# Patient Record
Sex: MALE | NOT HISPANIC OR LATINO | URBAN - METROPOLITAN AREA
[De-identification: names, ages, dates, MRNs, and addresses within clinical notes are randomized per-mention and may not be internally consistent; named-entity substitution may affect disease eponyms.]

---

## 2022-10-26 DIAGNOSIS — Z12.11 SCREENING FOR COLON CANCER: Primary | ICD-10-CM

## 2024-01-31 ENCOUNTER — TELEPHONE (OUTPATIENT)
Dept: ENDOSCOPY | Facility: HOSPITAL | Age: 49
End: 2024-01-31
Payer: COMMERCIAL

## 2024-01-31 NOTE — TELEPHONE ENCOUNTER
----- Message from Juliette Younger sent at 1/22/2024  8:16 AM CST -----  Regarding: FW: Referral    ----- Message -----  From: Angelique Gilman  Sent: 1/19/2024   8:30 AM CST  To: [facility] Endo Schedulers  Subject: Referral                                         Good morning,    Dr. Granados would like to refer the following patient to the Gastro department for a cscope. I have scanned the patients referral and records into .     Thanks,    Angelique BE  [facility]

## 2025-01-07 ENCOUNTER — HOSPITAL ENCOUNTER (EMERGENCY)
Facility: HOSPITAL | Age: 50
Discharge: HOME | End: 2025-01-07
Attending: EMERGENCY MEDICINE
Payer: COMMERCIAL

## 2025-01-07 VITALS
TEMPERATURE: 98 F | DIASTOLIC BLOOD PRESSURE: 77 MMHG | OXYGEN SATURATION: 97 % | RESPIRATION RATE: 20 BRPM | HEART RATE: 84 BPM | SYSTOLIC BLOOD PRESSURE: 137 MMHG

## 2025-01-07 DIAGNOSIS — I48.91 ATRIAL FIBRILLATION WITH RAPID VENTRICULAR RESPONSE: Primary | ICD-10-CM

## 2025-01-07 LAB
ALBUMIN SERPL BCP-MCNC: 4.3 G/DL (ref 3.5–5.2)
ALP SERPL-CCNC: 64 U/L (ref 40–150)
ALT SERPL W/O P-5'-P-CCNC: 27 U/L (ref 10–44)
ANION GAP SERPL CALC-SCNC: 12 MMOL/L (ref 8–16)
AST SERPL-CCNC: 20 U/L (ref 10–40)
BASOPHILS # BLD AUTO: 0.07 K/UL (ref 0–0.2)
BASOPHILS NFR BLD: 0.7 % (ref 0–1.9)
BILIRUB SERPL-MCNC: 0.7 MG/DL (ref 0.1–1)
BNP SERPL-MCNC: <10 PG/ML (ref 0–99)
BUN SERPL-MCNC: 27 MG/DL (ref 6–20)
CALCIUM SERPL-MCNC: 10.5 MG/DL (ref 8.7–10.5)
CHLORIDE SERPL-SCNC: 106 MMOL/L (ref 95–110)
CO2 SERPL-SCNC: 19 MMOL/L (ref 23–29)
CREAT SERPL-MCNC: 1.4 MG/DL (ref 0.5–1.4)
DIFFERENTIAL METHOD BLD: ABNORMAL
EOSINOPHIL # BLD AUTO: 0.1 K/UL (ref 0–0.5)
EOSINOPHIL NFR BLD: 1.1 % (ref 0–8)
ERYTHROCYTE [DISTWIDTH] IN BLOOD BY AUTOMATED COUNT: 12.8 % (ref 11.5–14.5)
EST. GFR  (NO RACE VARIABLE): >60 ML/MIN/1.73 M^2
GLUCOSE SERPL-MCNC: 199 MG/DL (ref 70–110)
HCT VFR BLD AUTO: 49.5 % (ref 40–54)
HCV AB SERPL QL IA: NORMAL
HGB BLD-MCNC: 16.2 G/DL (ref 14–18)
HIV 1+2 AB+HIV1 P24 AG SERPL QL IA: NORMAL
IMM GRANULOCYTES # BLD AUTO: 0.14 K/UL (ref 0–0.04)
IMM GRANULOCYTES NFR BLD AUTO: 1.3 % (ref 0–0.5)
LYMPHOCYTES # BLD AUTO: 1.2 K/UL (ref 1–4.8)
LYMPHOCYTES NFR BLD: 11.2 % (ref 18–48)
MCH RBC QN AUTO: 27.5 PG (ref 27–31)
MCHC RBC AUTO-ENTMCNC: 32.7 G/DL (ref 32–36)
MCV RBC AUTO: 84 FL (ref 82–98)
MONOCYTES # BLD AUTO: 0.8 K/UL (ref 0.3–1)
MONOCYTES NFR BLD: 7 % (ref 4–15)
NEUTROPHILS # BLD AUTO: 8.4 K/UL (ref 1.8–7.7)
NEUTROPHILS NFR BLD: 78.7 % (ref 38–73)
NRBC BLD-RTO: 0 /100 WBC
OHS QRS DURATION: 74 MS
OHS QTC CALCULATION: 431 MS
PLATELET # BLD AUTO: 328 K/UL (ref 150–450)
PMV BLD AUTO: 10.3 FL (ref 9.2–12.9)
POTASSIUM SERPL-SCNC: 4.1 MMOL/L (ref 3.5–5.1)
PROT SERPL-MCNC: 8.5 G/DL (ref 6–8.4)
RBC # BLD AUTO: 5.89 M/UL (ref 4.6–6.2)
SODIUM SERPL-SCNC: 137 MMOL/L (ref 136–145)
TROPONIN I SERPL DL<=0.01 NG/ML-MCNC: <3 NG/L (ref 0–35)
WBC # BLD AUTO: 10.68 K/UL (ref 3.9–12.7)

## 2025-01-07 PROCEDURE — 93010 ELECTROCARDIOGRAM REPORT: CPT | Mod: ,,, | Performed by: INTERNAL MEDICINE

## 2025-01-07 PROCEDURE — 85025 COMPLETE CBC W/AUTO DIFF WBC: CPT | Performed by: EMERGENCY MEDICINE

## 2025-01-07 PROCEDURE — 83880 ASSAY OF NATRIURETIC PEPTIDE: CPT | Performed by: EMERGENCY MEDICINE

## 2025-01-07 PROCEDURE — 86803 HEPATITIS C AB TEST: CPT | Performed by: PHYSICIAN ASSISTANT

## 2025-01-07 PROCEDURE — 87389 HIV-1 AG W/HIV-1&-2 AB AG IA: CPT | Performed by: PHYSICIAN ASSISTANT

## 2025-01-07 PROCEDURE — 96374 THER/PROPH/DIAG INJ IV PUSH: CPT

## 2025-01-07 PROCEDURE — 84484 ASSAY OF TROPONIN QUANT: CPT | Performed by: EMERGENCY MEDICINE

## 2025-01-07 PROCEDURE — 93005 ELECTROCARDIOGRAM TRACING: CPT

## 2025-01-07 PROCEDURE — 80053 COMPREHEN METABOLIC PANEL: CPT | Performed by: EMERGENCY MEDICINE

## 2025-01-07 PROCEDURE — 96361 HYDRATE IV INFUSION ADD-ON: CPT

## 2025-01-07 PROCEDURE — 93010 ELECTROCARDIOGRAM REPORT: CPT | Mod: 77,,, | Performed by: INTERNAL MEDICINE

## 2025-01-07 PROCEDURE — 99284 EMERGENCY DEPT VISIT MOD MDM: CPT | Mod: 25

## 2025-01-07 PROCEDURE — 25000003 PHARM REV CODE 250: Performed by: EMERGENCY MEDICINE

## 2025-01-07 RX ORDER — METOPROLOL TARTRATE 25 MG/1
25 TABLET, FILM COATED ORAL 2 TIMES DAILY
Qty: 60 TABLET | Refills: 0 | Status: SHIPPED | OUTPATIENT
Start: 2025-01-07 | End: 2025-01-14 | Stop reason: SDUPTHER

## 2025-01-07 RX ORDER — DILTIAZEM HYDROCHLORIDE 5 MG/ML
20 INJECTION INTRAVENOUS
Status: COMPLETED | OUTPATIENT
Start: 2025-01-07 | End: 2025-01-07

## 2025-01-07 RX ADMIN — DILTIAZEM HYDROCHLORIDE 20 MG: 5 INJECTION INTRAVENOUS at 04:01

## 2025-01-07 RX ADMIN — SODIUM CHLORIDE 500 ML: 9 INJECTION, SOLUTION INTRAVENOUS at 03:01

## 2025-01-07 NOTE — DISCHARGE INSTRUCTIONS
STOP your carvediolol.  START metoprolol and xarelto.    Our goal in the emergency department is to always give you outstanding care and exceptional service. You may receive a survey by mail or e-mail in the next week regarding your experience in our ED. We would greatly appreciate your completing and returning the survey. Your feedback provides us with a way to recognize our staff who give very good care and it helps us learn how to improve when your experience was below our aspiration of excellence.

## 2025-01-07 NOTE — ED TRIAGE NOTES
Jaye Henryleeann Ferguson Jr., a 49 y.o. male presents to the ED w/ complaint of new onset afib. Pt presents to ED via personal vehicle with referral from PCP for new onset afib. Pt states he went to the pcp had labs drawn and an ultrasound and was told to come to ED.     Triage note:  Chief Complaint   Patient presents with    new onset afib     Patient sent by primary for new onset Afib with rvr.     Review of patient's allergies indicates:  Not on File  No past medical history on file.

## 2025-01-07 NOTE — ED PROVIDER NOTES
Encounter Date: 1/7/2025       History     Chief Complaint   Patient presents with    new onset afib     Patient sent by primary for new onset Afib with rvr.     49-year-old male with a history of hypertension, hyperlipidemia, and diabetes presents with elevated heart rate.  Found to be in AFib during routine physical.  He denies chest pain, shortness of breath, fatigue, or palpitations.  He recently had a URI.  Today denies cough, nausea, vomiting, abdominal pain, or dysuria.  He is on carvedilol twice a day.  The patients available PMH, PSH, Social History, medications, allergies, and triage vital signs were reviewed just prior to their medical evaluation.         Review of patient's allergies indicates:  Not on File  History reviewed. No pertinent past medical history.  History reviewed. No pertinent surgical history.  No family history on file.  Social History     Tobacco Use    Smoking status: Never     Passive exposure: Never    Smokeless tobacco: Never   Substance Use Topics    Drug use: Never     Review of Systems   Constitutional:  Negative for fever.   Respiratory:  Negative for cough and shortness of breath.    Cardiovascular:  Negative for chest pain.   Gastrointestinal:  Negative for abdominal pain, nausea and vomiting.   Genitourinary:  Negative for dysuria.       Physical Exam     Initial Vitals [01/07/25 1403]   BP Pulse Resp Temp SpO2   (!) 144/84 (!) 120 20 98 °F (36.7 °C) 100 %      MAP       --         Physical Exam    Nursing note and vitals reviewed.  Constitutional: He appears well-developed and well-nourished. He is not diaphoretic. No distress.   HENT:   Head: Normocephalic and atraumatic.   Nose: Nose normal.   Eyes: Conjunctivae are normal. Right eye exhibits no discharge. Left eye exhibits no discharge.   Neck: Neck supple.   Normal range of motion.  Cardiovascular:  Normal heart sounds.     Exam reveals no gallop and no friction rub.       No murmur heard.  Tachycardia, irregular  irregular   Pulmonary/Chest: Breath sounds normal. No respiratory distress. He has no wheezes. He has no rhonchi. He has no rales.   Abdominal: Abdomen is soft. He exhibits no distension. There is no abdominal tenderness. There is no rebound and no guarding.   Musculoskeletal:         General: No tenderness or edema. Normal range of motion.      Cervical back: Normal range of motion and neck supple.     Neurological: He is alert and oriented to person, place, and time. GCS score is 15. GCS eye subscore is 4. GCS verbal subscore is 5. GCS motor subscore is 6.   Skin: Skin is warm and dry. No rash noted. No erythema.   Psychiatric: He has a normal mood and affect. His behavior is normal. Judgment and thought content normal.         ED Course   Procedures  Labs Reviewed   CBC W/ AUTO DIFFERENTIAL - Abnormal       Result Value    WBC 10.68      RBC 5.89      Hemoglobin 16.2      Hematocrit 49.5      MCV 84      MCH 27.5      MCHC 32.7      RDW 12.8      Platelets 328      MPV 10.3      Immature Granulocytes 1.3 (*)     Gran # (ANC) 8.4 (*)     Immature Grans (Abs) 0.14 (*)     Lymph # 1.2      Mono # 0.8      Eos # 0.1      Baso # 0.07      nRBC 0      Gran % 78.7 (*)     Lymph % 11.2 (*)     Mono % 7.0      Eosinophil % 1.1      Basophil % 0.7      Differential Method Automated      Narrative:     Release to patient->Immediate   COMPREHENSIVE METABOLIC PANEL - Abnormal    Sodium 137      Potassium 4.1      Chloride 106      CO2 19 (*)     Glucose 199 (*)     BUN 27 (*)     Creatinine 1.4      Calcium 10.5      Total Protein 8.5 (*)     Albumin 4.3      Total Bilirubin 0.7      Alkaline Phosphatase 64      AST 20      ALT 27      eGFR >60.0      Anion Gap 12      Narrative:     Release to patient->Immediate   TROPONIN I HIGH SENSITIVITY    Troponin I High Sensitivity <3      Narrative:     Release to patient->Immediate   B-TYPE NATRIURETIC PEPTIDE    BNP <10      Narrative:     Release to patient->Immediate   HEPATITIS  C ANTIBODY    Hepatitis C Ab Non-reactive      Narrative:     Release to patient->Immediate   HIV 1 / 2 ANTIBODY    HIV 1/2 Ag/Ab Non-reactive      Narrative:     Release to patient->Immediate     EKG Readings: (Independently Interpreted)   Initial Reading: No STEMI. Rhythm: Atrial Fibrillation. Heart Rate: 115. Ectopy: No Ectopy. Conduction: Normal. ST Segments: Normal ST Segments. T Waves: Normal.   Afib with rvr, poor qrs progression in V3-4      ECG Results              EKG 12-lead (Final result)        Collection Time Result Time QRS Duration OHS QTC Calculation    01/07/25 15:17:14 01/07/25 15:31:22 74 431                     Final result by Interface, Lab In [facility] (01/07/25 15:31:25)                   Narrative:    Test Reason : R07.9,    Vent. Rate : 115 BPM     Atrial Rate : 187 BPM     P-R Int :    ms          QRS Dur :  74 ms      QT Int : 312 ms       P-R-T Axes :     16  75 degrees    QTcB Int : 431 ms    Atrial fibrillation with rapid ventricular response  Abnormal ECG  No previous ECGs available  Confirmed by Deloris Madrigal ([id number]) on 1/7/2025 3:31:17 PM    Referred By: AAAREFERRAL SELF           Confirmed By: Deloris Madrigal                                  Imaging Results    None          Medications   sodium chloride 0.9% bolus 500 mL 500 mL (0 mLs Intravenous Stopped 1/7/25 1658)   diltiaZEM injection 20 mg (20 mg Intravenous Given 1/7/25 1607)     Medical Decision Making  49-year-old male presents with new AFib with RVR.  Vitals with tachycardia.  Physical exam as above.  EKG confirms AFib with RVR.  Treated with IV fluids and diltiazem.  Now rate controlled.  Rhythm is still atrial fibrillation.  Will discharge home with new prescriptions for metoprolol and Xarelto.  Will discontinue carvedilol.  Urgent consult placed for EP follow-up.  He will call tomorrow to arrange.  Patient will return to ED for worsening symptoms, inability to eat/drink, fever greater than 100.4, or any other concerns. Did  bedside teaching with return precautions.  All questions answered.  The patient acknowledges understanding.  Gave written and verbal discharge instructions.     Amount and/or Complexity of Data Reviewed  Labs: ordered. Decision-making details documented in ED Course.  ECG/medicine tests: ordered and independent interpretation performed. Decision-making details documented in ED Course.    Risk  Prescription drug management.    Critical Care  Total time providing critical care: 35 minutes                   Critical Care:  Date: 01/07/2025  Performed by: Dr. Arturo Ratliff   Authorized by: Dr. Arturo Ratliff  Total critical care time (exclusive of procedural time) : 35 minutes  Critical care was necessary to treat or prevent imminent or life-threatening deterioration of the following conditions:  afib with rvr needing iv diltiazem                    Clinical Impression:  Final diagnoses:  [I48.91] Atrial fibrillation with rapid ventricular response (Primary)          ED Disposition Condition    Discharge Stable          ED Prescriptions       Medication Sig Dispense Start Date End Date Auth. Provider    rivaroxaban (XARELTO) 20 mg Tab Take 1 tablet (20 mg total) by mouth daily with dinner or evening meal. 30 tablet 1/7/2025 2/6/2025 Arturo Ratliff MD    metoprolol tartrate (LOPRESSOR) 25 MG tablet Take 1 tablet (25 mg total) by mouth 2 (two) times daily. 60 tablet 1/7/2025 2/6/2025 Arturo Ratliff MD          Follow-up Information       Follow up With Specialties Details Why Contact Info Additional Information    [facility] - Cardiology - 3rd Fl Cardiology   [address] [facility] [id number] [phone] Cardiology Services Clinics - 3rd floor    [facility] - Emergency Dept Emergency Medicine  Return to ED for worsening symptoms, inability to eat/drink, fever greater than 100.4, or any other concerns. [address] [facility] [id number] [phone]              Evy  Arturo ARMENDARIZ MD  01/07/25 4504

## 2025-01-07 NOTE — FIRST PROVIDER EVALUATION
Medical screening examination initiated.  I have conducted a focused provider triage encounter, findings are as follows:    Brief history of present illness:  Referred by PCP for new onset Afib w/ RVR at PCP    There were no vitals filed for this visit.    Pertinent physical exam:  Well appearing, no distress. Ambulating without difficulty.     Brief workup plan:  Afib w/u    Preliminary workup initiated; this workup will be continued and followed by the physician or advanced practice provider that is assigned to the patient when roomed.

## 2025-01-08 LAB
OHS QRS DURATION: 74 MS
OHS QRS DURATION: 76 MS
OHS QTC CALCULATION: 401 MS
OHS QTC CALCULATION: 422 MS

## 2025-01-14 ENCOUNTER — OFFICE VISIT (OUTPATIENT)
Dept: CARDIOLOGY | Facility: CLINIC | Age: 50
End: 2025-01-14
Payer: COMMERCIAL

## 2025-01-14 VITALS
HEIGHT: 71 IN | OXYGEN SATURATION: 99 % | DIASTOLIC BLOOD PRESSURE: 90 MMHG | BODY MASS INDEX: 27.53 KG/M2 | SYSTOLIC BLOOD PRESSURE: 134 MMHG | WEIGHT: 196.63 LBS | HEART RATE: 87 BPM

## 2025-01-14 DIAGNOSIS — I10 ESSENTIAL HYPERTENSION: ICD-10-CM

## 2025-01-14 DIAGNOSIS — I48.91 ATRIAL FIBRILLATION WITH RAPID VENTRICULAR RESPONSE: Primary | ICD-10-CM

## 2025-01-14 DIAGNOSIS — E78.2 MIXED HYPERLIPIDEMIA: ICD-10-CM

## 2025-01-14 PROCEDURE — 99999 PR PBB SHADOW E&M-EST. PATIENT-LVL IV: CPT | Mod: PBBFAC,,, | Performed by: INTERNAL MEDICINE

## 2025-01-14 PROCEDURE — 99204 OFFICE O/P NEW MOD 45 MIN: CPT | Mod: S$GLB,,, | Performed by: INTERNAL MEDICINE

## 2025-01-14 RX ORDER — LISINOPRIL 20 MG/1
20 TABLET ORAL
COMMUNITY

## 2025-01-14 RX ORDER — METOPROLOL TARTRATE 25 MG/1
25 TABLET, FILM COATED ORAL 2 TIMES DAILY
Qty: 180 TABLET | Refills: 3 | Status: SHIPPED | OUTPATIENT
Start: 2025-01-14 | End: 2026-01-09

## 2025-01-14 RX ORDER — ROSUVASTATIN CALCIUM 20 MG/1
20 TABLET, COATED ORAL DAILY
COMMUNITY

## 2025-01-14 RX ORDER — TIRZEPATIDE 7.5 MG/.5ML
7.5 INJECTION, SOLUTION SUBCUTANEOUS
COMMUNITY

## 2025-01-14 RX ORDER — FENOFIBRATE 160 MG/1
160 TABLET ORAL DAILY
COMMUNITY

## 2025-01-17 PROBLEM — E78.2 MIXED HYPERLIPIDEMIA: Status: ACTIVE | Noted: 2025-01-17

## 2025-01-17 PROBLEM — I10 ESSENTIAL HYPERTENSION: Status: ACTIVE | Noted: 2025-01-17

## 2025-01-17 PROBLEM — I48.91 ATRIAL FIBRILLATION WITH RAPID VENTRICULAR RESPONSE: Status: ACTIVE | Noted: 2025-01-17

## 2025-01-17 NOTE — PROGRESS NOTES
Subjective:   Chief Complaint: Atrial Fibrillation  Last Clinic Visit: New Patient    History of Present Illness: Jaye Ferguson Jr. is a 49 y.o. gentleman with recently diagnosed atrial fibrillation, hypertension, hyperlipidemia, who presents to establish cardiology care.  He was referred to electrophysiology, but the got scheduled into general cardiology clinic not clear how.  He reports recently going to his primary care doctor who noted that he had elevated heart rate, EKG obtained showing atrial fibrillation and he was referred to the ED. presented to the ED was given metoprolol had reduction in heart rate but maintained atrial fibrillation relatively asymptomatic no evidence of heart failure or underlying triggering cause/illness, thus started on anticoagulation and set up with Cardiology as an outpatient.  He denies any known prior history of atrial fibrillation no personal history of stroke or TIA, denies any palpitations.  Exercises on a regular basis running on the elliptical 4 to 5 times a week denies any significant exercise intolerance.  No excessive recent alcohol use.  He has had hypertension since he was in his 20s denies any tobacco use, also has some hyperlipidemia.  Has a family history of hypertension in his mother otherwise no family history of premature atherosclerosis.  Checks his blood pressure intermittently reporting running in the 130s over 80s.  Works full-time as a  for [employer] in the [place] area.  Denies any significant bleeding issues, falls, or working with very sharp objects, does have some minor cuts and abrasions on his hands at work in the train y[place]  Telemetry strip obtained in the office this morning shows resolution of atrial fibrillation with sinus rhythm.  He reports he recently had a full echocardiogram obtained by his primary care doctor, and was told it was normal.    Dx:  Recently diagnosed atrial fibrillation   Hypertension  "  Hyperlipidemia    Medications:  Outpatient Encounter Medications as of 1/14/2025   Medication Sig Dispense Refill    fenofibrate 160 MG Tab Take 160 mg by mouth once daily.      lisinopriL (PRINIVIL,ZESTRIL) 20 MG tablet Take 20 mg by mouth before evening meal.      rosuvastatin (CRESTOR) 20 MG tablet Take 20 mg by mouth once daily.      tirzepatide (MOUNJARO) 7.5 mg/0.5 mL PnIj Inject 7.5 mg into the skin every 7 days.      [DISCONTINUED] metoprolol tartrate (LOPRESSOR) 25 MG tablet Take 1 tablet (25 mg total) by mouth 2 (two) times daily. 60 tablet 0    [DISCONTINUED] rivaroxaban (XARELTO) 20 mg Tab Take 1 tablet (20 mg total) by mouth daily with dinner or evening meal. 30 tablet 0    metoprolol tartrate (LOPRESSOR) 25 MG tablet Take 1 tablet (25 mg total) by mouth 2 (two) times daily. 180 tablet 3    rivaroxaban (XARELTO) 20 mg Tab Take 1 tablet (20 mg total) by mouth daily with dinner or evening meal. 90 tablet 3     No facility-administered encounter medications on file as of 1/14/2025.     Social History:  Asterjones reports that he has quit smoking. His smoking use included cigarettes. He has never been exposed to tobacco smoke. He has never used smokeless tobacco. He reports that he does not use drugs.    Objective:   BP (!) 134/90 (BP Location: Left arm, Patient Position: Sitting)   Pulse 87   Ht 5' 11" (1.803 m)   Wt 89.2 kg (196 lb 10.4 oz)   SpO2 99%   BMI 27.43 kg/m²     Physical Exam   Constitutional: He does not appear ill. No distress.   HENT:   Head: Normocephalic and atraumatic. Mouth/Throat: Mucous membranes are moist.   Cardiovascular: Normal rate, regular rhythm, normal heart sounds and normal pulses. Exam reveals no gallop and no friction rub.   No murmur heard.  Pulmonary/Chest: Effort normal and breath sounds normal. No stridor. No respiratory distress. He has no wheezes. He has no rhonchi. He has no rales. He exhibits no tenderness.   Abdominal: Normal appearance.   Musculoskeletal:      " Right lower leg: No edema.      Left lower leg: No edema.   Neurological: He is alert.   Skin: Skin is warm.        EKG:  My independent visualization of most recent EKG is atrial fibrillationTung mobile telemetry strip obtained in the office with Sinus rhythm        TTE:  Pending, released signed from primary care doctor.  Lipids:       Renal:  Recent Labs   Lab 01/07/25  1526   Creatinine 1.4   Potassium 4.1   CO2 19 L   BUN 27 H     Liver:  Recent Labs   Lab 01/07/25  1526   AST 20   ALT 27     Assessment:     1. Atrial fibrillation with rapid ventricular response    2. Essential hypertension    3. Mixed hyperlipidemia      Plan:   1. Atrial fibrillation with rapid ventricular response (Primary)  Chads Vasc of 1 almost 2.  Given that he is relatively asymptomatic, and now in his 50s, would recommend indefinite uninterrupted anticoagulation.  Discussed risk of bleeding, and importance of obtaining emergency care should he fall and have any head trauma he understood.  He will also attempt to avoid sharp objects at work and let us know if he does have any significant bleeding or traumatic injuries.  He feels this time though that he can tolerate anticoagulation and was agreeable to taking it.  Given heart rate well-controlled currently, will continue metoprolol, no evidence of bradycardia or lightheadedness.    Would like to obtain echocardiogram, but he reports that he recently had a complete echocardiogram performed by his primary care doctor within the last year, and he would like to hold off on performing it at this time, we will have him sign a release of records for obtaining this echocardiogram.  - Ambulatory referral/consult to Electrophysiology  - rivaroxaban (XARELTO) 20 mg Tab; Take 1 tablet (20 mg total) by mouth daily with dinner or evening meal.  Dispense: 90 tablet; Refill: 3  - metoprolol tartrate (LOPRESSOR) 25 MG tablet; Take 1 tablet (25 mg total) by mouth 2 (two) times daily.  Dispense: 180  tablet; Refill: 3    2. Essential hypertension  Blood pressure is mildly elevated, recommended he follow up with primary care doctor for ongoing management of blood pressure, likely would benefit from some medication uptitration.    3. Mixed hyperlipidemia  Continue statin, stable    Follow up in 1 year      Nicholas Frankel MD [facility]